# Patient Record
Sex: FEMALE | Race: WHITE | NOT HISPANIC OR LATINO | Employment: FULL TIME | URBAN - METROPOLITAN AREA
[De-identification: names, ages, dates, MRNs, and addresses within clinical notes are randomized per-mention and may not be internally consistent; named-entity substitution may affect disease eponyms.]

---

## 2022-07-26 LAB
HEPATITIS B SURFACE ANTIGEN (EXTERNAL): NONREACTIVE
HEPATITIS C ANTIBODY (EXTERNAL): NONREACTIVE
HIV1+2 AB SERPL QL IA: NONREACTIVE
RUBELLA ANTIBODY IGG (EXTERNAL): NORMAL
TREPONEMA PALLIDUM ANTIBODY (EXTERNAL): NONREACTIVE

## 2022-08-23 ENCOUNTER — HOSPITAL ENCOUNTER (OUTPATIENT)
Facility: AMBULATORY SURGERY CENTER | Age: 27
End: 2022-08-23
Attending: OBSTETRICS & GYNECOLOGY

## 2022-10-21 ENCOUNTER — TRANSCRIBE ORDERS (OUTPATIENT)
Dept: MATERNAL FETAL MEDICINE | Facility: HOSPITAL | Age: 27
End: 2022-10-21

## 2022-10-21 DIAGNOSIS — O26.90 PREGNANCY RELATED CONDITION, ANTEPARTUM: Primary | ICD-10-CM

## 2022-10-26 ENCOUNTER — PRE VISIT (OUTPATIENT)
Dept: MATERNAL FETAL MEDICINE | Facility: HOSPITAL | Age: 27
End: 2022-10-26

## 2022-11-02 ENCOUNTER — OFFICE VISIT (OUTPATIENT)
Dept: MATERNAL FETAL MEDICINE | Facility: HOSPITAL | Age: 27
End: 2022-11-02
Attending: REGISTERED NURSE
Payer: COMMERCIAL

## 2022-11-02 ENCOUNTER — ANCILLARY PROCEDURE (OUTPATIENT)
Dept: ULTRASOUND IMAGING | Facility: HOSPITAL | Age: 27
End: 2022-11-02
Attending: REGISTERED NURSE
Payer: COMMERCIAL

## 2022-11-02 DIAGNOSIS — O44.00 PLACENTA PREVIA ANTEPARTUM: ICD-10-CM

## 2022-11-02 DIAGNOSIS — O35.EXX0 PREGNANCY AFFECTED BY GENITOURINARY ABNORMALITY OF FETUS, SINGLE OR UNSPECIFIED FETUS: Primary | ICD-10-CM

## 2022-11-02 DIAGNOSIS — O26.90 PREGNANCY RELATED CONDITION, ANTEPARTUM: ICD-10-CM

## 2022-11-02 PROCEDURE — 76811 OB US DETAILED SNGL FETUS: CPT | Mod: 26 | Performed by: OBSTETRICS & GYNECOLOGY

## 2022-11-02 PROCEDURE — 76811 OB US DETAILED SNGL FETUS: CPT

## 2022-11-02 PROCEDURE — 99202 OFFICE O/P NEW SF 15 MIN: CPT | Mod: 25 | Performed by: OBSTETRICS & GYNECOLOGY

## 2022-11-02 NOTE — PROGRESS NOTES
"Please see \"Imaging\" tab under \"Chart Review\" for details of today's visit.    Javy Boucher    "

## 2023-01-11 ENCOUNTER — OFFICE VISIT (OUTPATIENT)
Dept: MATERNAL FETAL MEDICINE | Facility: HOSPITAL | Age: 28
End: 2023-01-11
Attending: OBSTETRICS & GYNECOLOGY
Payer: COMMERCIAL

## 2023-01-11 ENCOUNTER — ANCILLARY PROCEDURE (OUTPATIENT)
Dept: ULTRASOUND IMAGING | Facility: HOSPITAL | Age: 28
End: 2023-01-11
Attending: OBSTETRICS & GYNECOLOGY
Payer: COMMERCIAL

## 2023-01-11 DIAGNOSIS — O35.EXX0 PREGNANCY AFFECTED BY GENITOURINARY ABNORMALITY OF FETUS, SINGLE OR UNSPECIFIED FETUS: ICD-10-CM

## 2023-01-11 DIAGNOSIS — O44.00 PLACENTA PREVIA ANTEPARTUM: ICD-10-CM

## 2023-01-11 DIAGNOSIS — O35.EXX0 PREGNANCY AFFECTED BY GENITOURINARY ABNORMALITY OF FETUS, SINGLE OR UNSPECIFIED FETUS: Primary | ICD-10-CM

## 2023-01-11 DIAGNOSIS — O44.43 LOW LYING PLACENTA NOS OR WITHOUT HEMORRHAGE, THIRD TRIMESTER: ICD-10-CM

## 2023-01-11 PROCEDURE — 76816 OB US FOLLOW-UP PER FETUS: CPT | Mod: 26 | Performed by: OBSTETRICS & GYNECOLOGY

## 2023-01-11 PROCEDURE — 99213 OFFICE O/P EST LOW 20 MIN: CPT | Mod: 25 | Performed by: OBSTETRICS & GYNECOLOGY

## 2023-01-11 PROCEDURE — 76816 OB US FOLLOW-UP PER FETUS: CPT

## 2023-01-11 NOTE — PROGRESS NOTES
[facility] Visit    Alan presents to [facility] clinic for ultrasound and recommendations. The following problems were addressed:    Fetal pyelectasis  Low lying placenta    Tests Reviewed: prior ultrasound  Tests Ordered: none  Unique Records reviewed: [facility]    Impression:  1) Garcia intrauterine pregnancy at 32w 2d gestational age.   2) There is persistent, mild bilateral renal pelvis dilatation. Otherwise, none of the anomalies commonly detected by ultrasound were evident in the limited fetal anatomic survey as described above, anatomy limited by gestational age and fetal lie.   3) Growth parameters and estimated fetal weight were consistent with established dates.  4) The amniotic fluid volume appeared normal.  5) Normal fetal activity for gestational age.  6) The placenta is low lying, approximately 1.5 cm from the cervical os.    Plan:  Thank-you for referring your patient to assess fetal growth.     I discussed the findings on today's ultrasound with the patient. A referral to pediatric urology was sent due to persistent renal pelvis dilatation. We reviewed the possible causes for renal pelvis dilatation, and that despite its persistence, it is still most likely to resolve by age 2. No changes to delivery timing or location are recommended due to this finding.    We also discussed the low lying placenta. Given the placenta is > 1 cm from the cervical os, a trial of vaginal delivery is likely reasonable in the absence of vaginal bleeding. I do recommend a follow up ultrasound in your office in 3 weeks to reassess placental location.    Return to primary provider for continued prenatal care.    If you have questions regarding today's evaluation or if we can be of further service, please contact the [facility].    **Fetal anomalies may be present but not detected**    Sofie Bravo MD  Maternal Fetal Medicine    Total Time: 12 minutes spent on the date of the encounter doing chart review, history and  exam, documentation and further activities as noted above.

## 2023-02-07 LAB — GROUP B STREPTOCOCCUS (EXTERNAL): NEGATIVE

## 2023-02-12 ENCOUNTER — HEALTH MAINTENANCE LETTER (OUTPATIENT)
Age: 28
End: 2023-02-12

## 2023-02-21 ENCOUNTER — VIRTUAL VISIT (OUTPATIENT)
Dept: UROLOGY | Facility: CLINIC | Age: 28
End: 2023-02-21
Attending: NURSE PRACTITIONER
Payer: COMMERCIAL

## 2023-02-21 DIAGNOSIS — O35.EXX0 PYELECTASIS OF FETUS ON PRENATAL ULTRASOUND: Primary | ICD-10-CM

## 2023-02-21 PROCEDURE — 99202 OFFICE O/P NEW SF 15 MIN: CPT | Mod: VID | Performed by: NURSE PRACTITIONER

## 2023-02-21 PROCEDURE — G0463 HOSPITAL OUTPT CLINIC VISIT: HCPCS | Mod: PN,GT | Performed by: NURSE PRACTITIONER

## 2023-02-21 NOTE — LETTER
2023      RE: Alan Hayes  [address] [facility] [address]     Dear Colleague,    Thank you for the opportunity to participate in the care of your patient, Alan Hayes, at the [facility] at [facility]. Please see a copy of my visit note below.    Sofie Bravo  [address] [facility] [address]    RE:  Alan Hayes  :  1995  [place] MRN:  [id number]  Date of visit:  2023    Dear Dr. Bravo:    I had the pleasure of seeing your patient, Alan, today through the [facility] in urology consultation for the question of prenatally detected pyelectasis.  Please see below the details of this visit and my impression and plans discussed with the family.        CC:  Prenatal consult    HPI:  Alan Hayes is a 27 year old woman whom I was asked to see in consultation for the above. This is Alan's first pregnancy.  The sex of the fetus is unknown. At the 32 week ultrasound bilateral renal pelvis dilation without dilation of the calyces persisted (UTD A1). So far the pregnancy has been going OK. Alan is planning to deliver at [facility]. Alan's brother has a history of solitary kidney.     PMH:  Reviewed, no significant medical history     PSH:   Reviewed, no surgical history     Meds, allergies, family history, social history reviewed per intake form and confirmed in our EMR.    ROS:  Negative on a 12-point scale. All other pertinent positives mentioned in the HPI.    PE:  Last menstrual period 2022.  There is no height or weight on file to calculate BMI.  General:  Well-appearing woman, in no apparent distress.  HEENT:  Normocephalic, normal facies, moist mucous membranes  Resp:  No audible respirations  Neuromuscular:  Muscles symmetrically bulked/developed  Ext:  Full range of motion    Impression:  Prenatally detected fetal bilateral  pyelectasis (UTD A1), normal bladder.     Plan:    We discussed the likely prenatal causes for this, including prenatal obstructive issues that have already resolved versus those that may need surgical help with resolution in childhood, as well as the possibility of vesicoureteral reflux. We discussed the risks for urinary tract infection, and the pros and cons of starting the baby on daily, low-dose Amoxicillin, dosed at 10 mg/kg/d, which in this case we will likely not do. We also made our plans for follow-up after the baby is born with renal/bladder ultrasound in 2-4 weeks, possible VCUG if indicated after first post  imaging. I addressed all questions and encouraged a phone call from their MFM if there are any future concerns during the pregnancy.    Thank you very much for allowing me the opportunity to participate in this nice family's care with you.    I spent a total of 15 minutes on the date of encounter doing chart review, history, documentation, and further activities as noted above.    Sincerely,    KIYA Foy, CNP  Pediatric Urology  [facility]

## 2023-02-21 NOTE — PATIENT INSTRUCTIONS
[facility]   Department of Pediatric Urology    Dr. Cipriano Correia, Pediatric Urologist  Eric Dempsey, CPNP  KYLER Woo    [facility] [place]  Nurse Coordinator: Cristopher Burch [phone]    [facility]  Nurse Coordinator: [phone]    [facility]  Nurse Coordinator: Cristopher Burch -[phone]      [place]  Nurse Coordinator: GELY Argueta, [phone]      Once your baby is born, please do the following:    -After you have gone home, please call the Nurse Coordinator at your preferred  location and give her your baby s name and date of birth. She will  help coordinate the tests that need to be done about 4 weeks  after birth.    Your baby s test may include:  -Renal Bladder Ultrasound  (all locations)  - Voiding Cystourethrogram (VCUG)  ([facility])  -Mag 3 Lasix Renogram  ([facility])

## 2023-02-21 NOTE — NURSING NOTE
Alan Hayes complains of    Chief Complaint   Patient presents with     RECHECK     Congenital hydronephrosis        Patient would like the video invitation sent by: Text to cell phone: [phone]     Patient is located in [place]? Yes     I have reviewed and updated the patient's medication list, allergies and preferred pharmacy.      Sandie Smith

## 2023-02-21 NOTE — PROGRESS NOTES
Sofie Bravo  [address] [facility] [address]    RE:  Alan Hayes  :  1995  [place] MRN:  [id number]  Date of visit:  2023    Dear Dr. Bravo:    I had the pleasure of seeing your patient, Alan, today through the [facility] in urology consultation for the question of prenatally detected pyelectasis.  Please see below the details of this visit and my impression and plans discussed with the family.        CC:  Prenatal consult    HPI:  Alan Hayes is a 27 year old woman whom I was asked to see in consultation for the above. This is Alan's first pregnancy.  The sex of the fetus is unknown. At the 32 week ultrasound bilateral renal pelvis dilation without dilation of the calyces persisted (UTD A1). So far the pregnancy has been going OK. Alan is planning to deliver at [facility]. Alan's brother has a history of solitary kidney.     PMH:  Reviewed, no significant medical history     PSH:   Reviewed, no surgical history     Meds, allergies, family history, social history reviewed per intake form and confirmed in our EMR.    ROS:  Negative on a 12-point scale. All other pertinent positives mentioned in the HPI.    PE:  Last menstrual period 2022.  There is no height or weight on file to calculate BMI.  General:  Well-appearing woman, in no apparent distress.  HEENT:  Normocephalic, normal facies, moist mucous membranes  Resp:  No audible respirations  Neuromuscular:  Muscles symmetrically bulked/developed  Ext:  Full range of motion    Impression:  Prenatally detected fetal bilateral pyelectasis (UTD A1), normal bladder.     Plan:    We discussed the likely prenatal causes for this, including prenatal obstructive issues that have already resolved versus those that may need surgical help with resolution in childhood, as well as the possibility of vesicoureteral reflux. We discussed the risks for urinary tract infection, and the pros  and cons of starting the baby on daily, low-dose Amoxicillin, dosed at 10 mg/kg/d, which in this case we will likely not do. We also made our plans for follow-up after the baby is born with renal/bladder ultrasound in 2-4 weeks, possible VCUG if indicated after first post  imaging. I addressed all questions and encouraged a phone call from their MFM if there are any future concerns during the pregnancy.    Thank you very much for allowing me the opportunity to participate in this nice family's care with you.    I spent a total of 15 minutes on the date of encounter doing chart review, history, documentation, and further activities as noted above.    Video-Visit Details    Type of service:  Video Visit    Video Start Time (time video started): 08:57    Video End Time (time video stopped): 09:05    Originating Location (pt. Location): Other Car        Distant Location (provider location):  On-site    Mode of Communication:  Video Conference via [employer]      Sincerely,  Eric Dempsey APRN, CNP  Pediatric Urology  [facility]

## 2023-03-02 ENCOUNTER — ANESTHESIA EVENT (OUTPATIENT)
Dept: OBGYN | Facility: CLINIC | Age: 28
End: 2023-03-02
Payer: COMMERCIAL

## 2023-03-02 ENCOUNTER — ANESTHESIA (OUTPATIENT)
Dept: OBGYN | Facility: CLINIC | Age: 28
End: 2023-03-02
Payer: COMMERCIAL

## 2023-03-02 ENCOUNTER — HOSPITAL ENCOUNTER (INPATIENT)
Facility: CLINIC | Age: 28
LOS: 2 days | Discharge: HOME | End: 2023-03-04
Attending: REGISTERED NURSE | Admitting: REGISTERED NURSE
Payer: COMMERCIAL

## 2023-03-02 PROBLEM — Z34.90 TERM PREGNANCY: Status: ACTIVE | Noted: 2023-03-02

## 2023-03-02 LAB
ABO/RH(D): NORMAL
ANTIBODY SCREEN: NEGATIVE
APTT PPP: 26 SECONDS (ref 22–38)
BASOPHILS # BLD AUTO: 0 10E3/UL (ref 0–0.2)
BASOPHILS NFR BLD AUTO: 0 %
EOSINOPHIL # BLD AUTO: 0.1 10E3/UL (ref 0–0.7)
EOSINOPHIL NFR BLD AUTO: 1 %
ERYTHROCYTE [DISTWIDTH] IN BLOOD BY AUTOMATED COUNT: 13.2 % (ref 10–15)
FIBRINOGEN PPP-MCNC: 505 MG/DL (ref 170–490)
HCT VFR BLD AUTO: 40.7 % (ref 35–47)
HGB BLD-MCNC: 14 G/DL (ref 11.7–15.7)
IMM GRANULOCYTES # BLD: 0.1 10E3/UL
IMM GRANULOCYTES NFR BLD: 1 %
INR PPP: 0.95 (ref 0.85–1.15)
LYMPHOCYTES # BLD AUTO: 3.1 10E3/UL (ref 0.8–5.3)
LYMPHOCYTES NFR BLD AUTO: 30 %
MCH RBC QN AUTO: 31.5 PG (ref 26.5–33)
MCHC RBC AUTO-ENTMCNC: 34.4 G/DL (ref 31.5–36.5)
MCV RBC AUTO: 92 FL (ref 78–100)
MONOCYTES # BLD AUTO: 1 10E3/UL (ref 0–1.3)
MONOCYTES NFR BLD AUTO: 9 %
NEUTROPHILS # BLD AUTO: 6.1 10E3/UL (ref 1.6–8.3)
NEUTROPHILS NFR BLD AUTO: 59 %
NRBC # BLD AUTO: 0 10E3/UL
NRBC BLD AUTO-RTO: 0 /100
PLATELET # BLD AUTO: 233 10E3/UL (ref 150–450)
RBC # BLD AUTO: 4.45 10E6/UL (ref 3.8–5.2)
RUPTURE OF FETAL MEMBRANES BY ROM PLUS: POSITIVE
SPECIMEN EXPIRATION DATE: NORMAL
T PALLIDUM AB SER QL: NONREACTIVE
WBC # BLD AUTO: 10.4 10E3/UL (ref 4–11)

## 2023-03-02 PROCEDURE — 85730 THROMBOPLASTIN TIME PARTIAL: CPT | Performed by: REGISTERED NURSE

## 2023-03-02 PROCEDURE — 250N000011 HC RX IP 250 OP 636: Performed by: REGISTERED NURSE

## 2023-03-02 PROCEDURE — 86780 TREPONEMA PALLIDUM: CPT | Performed by: REGISTERED NURSE

## 2023-03-02 PROCEDURE — 86850 RBC ANTIBODY SCREEN: CPT | Performed by: REGISTERED NURSE

## 2023-03-02 PROCEDURE — 85025 COMPLETE CBC W/AUTO DIFF WBC: CPT | Performed by: REGISTERED NURSE

## 2023-03-02 PROCEDURE — 00HU33Z INSERTION OF INFUSION DEVICE INTO SPINAL CANAL, PERCUTANEOUS APPROACH: ICD-10-PCS | Performed by: ANESTHESIOLOGY

## 2023-03-02 PROCEDURE — 370N000003 HC ANESTHESIA WARD SERVICE

## 2023-03-02 PROCEDURE — 250N000011 HC RX IP 250 OP 636: Performed by: ANESTHESIOLOGY

## 2023-03-02 PROCEDURE — 85610 PROTHROMBIN TIME: CPT | Performed by: REGISTERED NURSE

## 2023-03-02 PROCEDURE — 120N000001 HC R&B MED SURG/OB

## 2023-03-02 PROCEDURE — 250N000009 HC RX 250: Performed by: ANESTHESIOLOGY

## 2023-03-02 PROCEDURE — 84112 EVAL AMNIOTIC FLUID PROTEIN: CPT | Performed by: REGISTERED NURSE

## 2023-03-02 PROCEDURE — 250N000009 HC RX 250: Performed by: REGISTERED NURSE

## 2023-03-02 PROCEDURE — 36415 COLL VENOUS BLD VENIPUNCTURE: CPT | Performed by: REGISTERED NURSE

## 2023-03-02 PROCEDURE — 250N000013 HC RX MED GY IP 250 OP 250 PS 637: Performed by: REGISTERED NURSE

## 2023-03-02 PROCEDURE — 258N000003 HC RX IP 258 OP 636: Performed by: REGISTERED NURSE

## 2023-03-02 PROCEDURE — 3E0R3BZ INTRODUCTION OF ANESTHETIC AGENT INTO SPINAL CANAL, PERCUTANEOUS APPROACH: ICD-10-PCS | Performed by: ANESTHESIOLOGY

## 2023-03-02 PROCEDURE — 85384 FIBRINOGEN ACTIVITY: CPT | Performed by: REGISTERED NURSE

## 2023-03-02 RX ORDER — NALBUPHINE HYDROCHLORIDE 10 MG/ML
2.5-5 INJECTION, SOLUTION INTRAMUSCULAR; INTRAVENOUS; SUBCUTANEOUS EVERY 6 HOURS PRN
Status: DISCONTINUED | OUTPATIENT
Start: 2023-03-02 | End: 2023-03-04 | Stop reason: HOSPADM

## 2023-03-02 RX ORDER — METHYLERGONOVINE MALEATE 0.2 MG/ML
200 INJECTION INTRAVENOUS
Status: DISCONTINUED | OUTPATIENT
Start: 2023-03-02 | End: 2023-03-03 | Stop reason: HOSPADM

## 2023-03-02 RX ORDER — BUPIVACAINE HYDROCHLORIDE 2.5 MG/ML
INJECTION, SOLUTION EPIDURAL; INFILTRATION; INTRACAUDAL
Status: COMPLETED | OUTPATIENT
Start: 2023-03-02 | End: 2023-03-02

## 2023-03-02 RX ORDER — CITRIC ACID/SODIUM CITRATE 334-500MG
30 SOLUTION, ORAL ORAL
Status: DISCONTINUED | OUTPATIENT
Start: 2023-03-02 | End: 2023-03-03 | Stop reason: HOSPADM

## 2023-03-02 RX ORDER — CALCIUM CARBONATE 500 MG/1
1000 TABLET, CHEWABLE ORAL 3 TIMES DAILY PRN
Status: DISCONTINUED | OUTPATIENT
Start: 2023-03-02 | End: 2023-03-04 | Stop reason: HOSPADM

## 2023-03-02 RX ORDER — LIDOCAINE 40 MG/G
CREAM TOPICAL
Status: DISCONTINUED | OUTPATIENT
Start: 2023-03-02 | End: 2023-03-02 | Stop reason: HOSPADM

## 2023-03-02 RX ORDER — LIDOCAINE 40 MG/G
CREAM TOPICAL
Status: DISCONTINUED | OUTPATIENT
Start: 2023-03-02 | End: 2023-03-03 | Stop reason: HOSPADM

## 2023-03-02 RX ORDER — MISOPROSTOL 200 UG/1
400 TABLET ORAL
Status: DISCONTINUED | OUTPATIENT
Start: 2023-03-02 | End: 2023-03-03 | Stop reason: HOSPADM

## 2023-03-02 RX ORDER — PROCHLORPERAZINE 25 MG
25 SUPPOSITORY, RECTAL RECTAL EVERY 12 HOURS PRN
Status: DISCONTINUED | OUTPATIENT
Start: 2023-03-02 | End: 2023-03-03 | Stop reason: HOSPADM

## 2023-03-02 RX ORDER — FENTANYL/ROPIVACAINE/NS/PF 2MCG/ML-.1
PLASTIC BAG, INJECTION (ML) EPIDURAL
Status: DISCONTINUED | OUTPATIENT
Start: 2023-03-02 | End: 2023-03-03 | Stop reason: HOSPADM

## 2023-03-02 RX ORDER — LIDOCAINE HYDROCHLORIDE AND EPINEPHRINE 15; 5 MG/ML; UG/ML
INJECTION, SOLUTION EPIDURAL PRN
Status: DISCONTINUED | OUTPATIENT
Start: 2023-03-02 | End: 2023-03-03

## 2023-03-02 RX ORDER — OXYTOCIN/0.9 % SODIUM CHLORIDE 30/500 ML
340 PLASTIC BAG, INJECTION (ML) INTRAVENOUS CONTINUOUS PRN
Status: DISCONTINUED | OUTPATIENT
Start: 2023-03-02 | End: 2023-03-03 | Stop reason: HOSPADM

## 2023-03-02 RX ORDER — FENTANYL CITRATE-0.9 % NACL/PF 10 MCG/ML
100 PLASTIC BAG, INJECTION (ML) INTRAVENOUS EVERY 5 MIN PRN
Status: DISCONTINUED | OUTPATIENT
Start: 2023-03-02 | End: 2023-03-03 | Stop reason: HOSPADM

## 2023-03-02 RX ORDER — IBUPROFEN 800 MG/1
800 TABLET, FILM COATED ORAL
Status: DISCONTINUED | OUTPATIENT
Start: 2023-03-02 | End: 2023-03-04 | Stop reason: HOSPADM

## 2023-03-02 RX ORDER — KETOROLAC TROMETHAMINE 30 MG/ML
30 INJECTION, SOLUTION INTRAMUSCULAR; INTRAVENOUS
Status: DISCONTINUED | OUTPATIENT
Start: 2023-03-02 | End: 2023-03-04 | Stop reason: HOSPADM

## 2023-03-02 RX ORDER — MISOPROSTOL 200 UG/1
800 TABLET ORAL
Status: DISCONTINUED | OUTPATIENT
Start: 2023-03-02 | End: 2023-03-03 | Stop reason: HOSPADM

## 2023-03-02 RX ORDER — OXYTOCIN/0.9 % SODIUM CHLORIDE 30/500 ML
100-340 PLASTIC BAG, INJECTION (ML) INTRAVENOUS CONTINUOUS PRN
Status: DISCONTINUED | OUTPATIENT
Start: 2023-03-02 | End: 2023-03-04 | Stop reason: HOSPADM

## 2023-03-02 RX ORDER — NALOXONE HYDROCHLORIDE 0.4 MG/ML
0.4 INJECTION, SOLUTION INTRAMUSCULAR; INTRAVENOUS; SUBCUTANEOUS
Status: DISCONTINUED | OUTPATIENT
Start: 2023-03-02 | End: 2023-03-03 | Stop reason: HOSPADM

## 2023-03-02 RX ORDER — METOCLOPRAMIDE 10 MG/1
10 TABLET ORAL EVERY 6 HOURS PRN
Status: DISCONTINUED | OUTPATIENT
Start: 2023-03-02 | End: 2023-03-03 | Stop reason: HOSPADM

## 2023-03-02 RX ORDER — ONDANSETRON 2 MG/ML
4 INJECTION INTRAMUSCULAR; INTRAVENOUS EVERY 6 HOURS PRN
Status: DISCONTINUED | OUTPATIENT
Start: 2023-03-02 | End: 2023-03-03 | Stop reason: HOSPADM

## 2023-03-02 RX ORDER — CARBOPROST TROMETHAMINE 250 UG/ML
250 INJECTION, SOLUTION INTRAMUSCULAR
Status: DISCONTINUED | OUTPATIENT
Start: 2023-03-02 | End: 2023-03-03 | Stop reason: HOSPADM

## 2023-03-02 RX ORDER — ONDANSETRON 4 MG/1
4 TABLET, ORALLY DISINTEGRATING ORAL EVERY 6 HOURS PRN
Status: DISCONTINUED | OUTPATIENT
Start: 2023-03-02 | End: 2023-03-03 | Stop reason: HOSPADM

## 2023-03-02 RX ORDER — FENTANYL CITRATE 50 UG/ML
100 INJECTION, SOLUTION INTRAMUSCULAR; INTRAVENOUS
Status: DISCONTINUED | OUTPATIENT
Start: 2023-03-02 | End: 2023-03-03 | Stop reason: HOSPADM

## 2023-03-02 RX ORDER — SODIUM CHLORIDE, SODIUM LACTATE, POTASSIUM CHLORIDE, CALCIUM CHLORIDE 600; 310; 30; 20 MG/100ML; MG/100ML; MG/100ML; MG/100ML
INJECTION, SOLUTION INTRAVENOUS CONTINUOUS PRN
Status: DISCONTINUED | OUTPATIENT
Start: 2023-03-02 | End: 2023-03-03 | Stop reason: HOSPADM

## 2023-03-02 RX ORDER — NALOXONE HYDROCHLORIDE 0.4 MG/ML
0.2 INJECTION, SOLUTION INTRAMUSCULAR; INTRAVENOUS; SUBCUTANEOUS
Status: DISCONTINUED | OUTPATIENT
Start: 2023-03-02 | End: 2023-03-03 | Stop reason: HOSPADM

## 2023-03-02 RX ORDER — OXYTOCIN 10 [USP'U]/ML
10 INJECTION, SOLUTION INTRAMUSCULAR; INTRAVENOUS
Status: DISCONTINUED | OUTPATIENT
Start: 2023-03-02 | End: 2023-03-04 | Stop reason: HOSPADM

## 2023-03-02 RX ORDER — OXYTOCIN 10 [USP'U]/ML
10 INJECTION, SOLUTION INTRAMUSCULAR; INTRAVENOUS
Status: DISCONTINUED | OUTPATIENT
Start: 2023-03-02 | End: 2023-03-03 | Stop reason: HOSPADM

## 2023-03-02 RX ORDER — OXYTOCIN/0.9 % SODIUM CHLORIDE 30/500 ML
1-24 PLASTIC BAG, INJECTION (ML) INTRAVENOUS CONTINUOUS
Status: DISCONTINUED | OUTPATIENT
Start: 2023-03-02 | End: 2023-03-03 | Stop reason: HOSPADM

## 2023-03-02 RX ORDER — METOCLOPRAMIDE HYDROCHLORIDE 5 MG/ML
10 INJECTION INTRAMUSCULAR; INTRAVENOUS EVERY 6 HOURS PRN
Status: DISCONTINUED | OUTPATIENT
Start: 2023-03-02 | End: 2023-03-03 | Stop reason: HOSPADM

## 2023-03-02 RX ORDER — TERBUTALINE SULFATE 1 MG/ML
0.25 INJECTION, SOLUTION SUBCUTANEOUS
Status: DISCONTINUED | OUTPATIENT
Start: 2023-03-02 | End: 2023-03-03 | Stop reason: HOSPADM

## 2023-03-02 RX ORDER — PROCHLORPERAZINE MALEATE 10 MG
10 TABLET ORAL EVERY 6 HOURS PRN
Status: DISCONTINUED | OUTPATIENT
Start: 2023-03-02 | End: 2023-03-03 | Stop reason: HOSPADM

## 2023-03-02 RX ADMIN — LIDOCAINE HYDROCHLORIDE,EPINEPHRINE BITARTRATE 3 ML: 15; .005 INJECTION, SOLUTION EPIDURAL; INFILTRATION; INTRACAUDAL; PERINEURAL at 20:58

## 2023-03-02 RX ADMIN — LIDOCAINE HYDROCHLORIDE,EPINEPHRINE BITARTRATE 2 ML: 15; .005 INJECTION, SOLUTION EPIDURAL; INFILTRATION; INTRACAUDAL; PERINEURAL at 21:01

## 2023-03-02 RX ADMIN — SODIUM CHLORIDE, POTASSIUM CHLORIDE, SODIUM LACTATE AND CALCIUM CHLORIDE: 600; 310; 30; 20 INJECTION, SOLUTION INTRAVENOUS at 14:26

## 2023-03-02 RX ADMIN — Medication: at 21:05

## 2023-03-02 RX ADMIN — Medication 2 MILLI-UNITS/MIN: at 14:27

## 2023-03-02 RX ADMIN — CALCIUM CARBONATE (ANTACID) CHEW TAB 500 MG 1000 MG: 500 CHEW TAB at 11:43

## 2023-03-02 RX ADMIN — BUPIVACAINE HYDROCHLORIDE 5 ML: 2.5 INJECTION, SOLUTION EPIDURAL; INFILTRATION; INTRACAUDAL at 21:04

## 2023-03-02 RX ADMIN — ONDANSETRON 4 MG: 2 INJECTION INTRAMUSCULAR; INTRAVENOUS at 18:32

## 2023-03-02 RX ADMIN — SODIUM CHLORIDE, POTASSIUM CHLORIDE, SODIUM LACTATE AND CALCIUM CHLORIDE: 600; 310; 30; 20 INJECTION, SOLUTION INTRAVENOUS at 21:04

## 2023-03-02 RX ADMIN — CALCIUM CARBONATE (ANTACID) CHEW TAB 500 MG 1000 MG: 500 CHEW TAB at 05:28

## 2023-03-02 ASSESSMENT — ACTIVITIES OF DAILY LIVING (ADL)
ADLS_ACUITY_SCORE: 18
TOILETING_ISSUES: NO
ADLS_ACUITY_SCORE: 18
NUMBER_OF_TIMES_PATIENT_HAS_FALLEN_WITHIN_LAST_SIX_MONTHS: 1
ADLS_ACUITY_SCORE: 18
ADLS_ACUITY_SCORE: 18
CHANGE_IN_FUNCTIONAL_STATUS_SINCE_ONSET_OF_CURRENT_ILLNESS/INJURY: NO
ADLS_ACUITY_SCORE: 18
ADLS_ACUITY_SCORE: 18
ADLS_ACUITY_SCORE: 31
ADLS_ACUITY_SCORE: 18
WALKING_OR_CLIMBING_STAIRS_DIFFICULTY: NO
ADLS_ACUITY_SCORE: 18
ADLS_ACUITY_SCORE: 18
WEAR_GLASSES_OR_BLIND: NO
DRESSING/BATHING_DIFFICULTY: NO
DIFFICULTY_EATING/SWALLOWING: NO
CONCENTRATING,_REMEMBERING_OR_MAKING_DECISIONS_DIFFICULTY: NO
ADLS_ACUITY_SCORE: 18
FALL_HISTORY_WITHIN_LAST_SIX_MONTHS: YES
ADLS_ACUITY_SCORE: 18
DOING_ERRANDS_INDEPENDENTLY_DIFFICULTY: NO

## 2023-03-02 NOTE — PROVIDER NOTIFICATION
THEODORE Cortez called and notified pt SVE 3-4/70/-3. Pt had positive pooling upon speculum exam. ROM+ sent per CNM. CNM recommends pitocin to help augment labor. Pt educated on pitocin and currently wants to wait on augmentation. Pt is currently walking in the frank way. Will continue to monitor

## 2023-03-02 NOTE — PROVIDER NOTIFICATION
THEODORE Cortez called and requesting ROM+ to be repeated with a speculum and cervical exam around 6am. Pt is curently resting and no large amount of bleeding at this time. CAT1 tracing

## 2023-03-02 NOTE — PROGRESS NOTES
"Patient Name:  Alan Hayes  :      1995  MRN:      [id number]    Assessment:     at 39w3d  Early labor  Category 1 FHTs  SROM 3/2/23 2000      Plan:   -Bleeding has now greatly improved to light with mostly brown spotting. RN now able to verify pooling and resent ROM+. In the now confirmed setting of ROM I would recommend pitocin titration as cervix has remained unchanged and Alan remains comfortable without regular contractions.   -Routine support & management. Encourage position changes, ambulation as appropriate, rest as desired.  -Anticipate progress and NSVB.   -Reevaluate progress in 6 hours or sooner with a change in status.     Subjective:  Alan Hayes is coping well with contractions. Using non pharmacologic  for pain relief. Good PO fluid intake.   Voiding without issue. , Kaushal, supportive at bedside.       Objective:  /71   Temp 98.1  F (36.7  C) (Oral)   Resp 19   Ht 1.575 m (5' 2\")   Wt 66.7 kg (147 lb)   LMP 2022   Breastfeeding No   BMI 26.89 kg/m      FHR:Baseline: 115 bpm, Variability: Moderate (6 - 25 bpm), Accelerations: present and Decelerations: Absent    Uterine contractions:TocoFrequency: Every 5-6 minutes, Duration: 40-60 seconds and Intensity: moderate    SVE:3-4/50/-3 unchanged from arrival    Provider: Gisell Garcia CNM      Date:  3/2/2023  Time:  6:23 AM    "

## 2023-03-02 NOTE — H&P
HISTORY AND PHYSICAL UPDATE ADMISSION EXAM    Name: Alan Hayes  YOB: 1995  Medical Record Number: [id number]    History of Present Illness: Alan Hayes is a 27 year old female who is 39w3d pregnant and being admitted for labor management. She presented to [facility] with complaints of worsening bleeding throughout the evening. She has soaked multiple pads with bright red bleeding. Pregnancy was complicated by persistent low lying placenta resolved to 2.4cm at 34w and history of LEEP. 35w growth US 67%. She is supported by her partner, Kaushal. She is jaqueline regularly and can feel them but denies outright pain.     Estimated Date of Delivery: Mar 6, 2023    EGA 39w3d    OB History    Para Term  AB Living   1 0 0 0 0 0   SAB IAB Ectopic Multiple Live Births   0 0 0 0 0      # Outcome Date GA Lbr Uriel/2nd Weight Sex Delivery Anes PTL Lv   1 Current                 Lab Results   Component Value Date    AS Negative 2023    HGB 14.0 2023       Prenatal Complications:     1) Varicella non-immune  2) Previa--> resolved 2.4cm from os at 34w  3) History of LEEP  4) Bilateral fetal pyelectasis-saw peds urology (UTD A1)    Exam:      LMP 2022   Breastfeeding No     Fetal heart Rate Category 1  Contractions 1-3 minutes    HEENT grossly normal  Neck: no lymphadenopathy or thryoidomegaly  Lungs CTAB  Heart RRR  ABD gravid, non-tender  EXT:  No edema, moves freely  Vaginal exam 1/50/-3 in clinic today--> 3-4/50/-3, large amount of bloody show  Membranes: ROM+ with possible false positive given large amount of bleeding; unable to determine pooling or ferning. Palpable bag present, possible high leak.   Cephalic by BSUS    Assessment: labor management  GBS negative  Vaginal bleeding    Plan: Admit - see IP orders  Pain medication as desired, she is undecided. Will take things as it goes.   Start pad weights. Clotting panel pending. Continuous fetal monitoring. Cervix feels soft with  ridge of scar tissue (history of LEEP), bleeding likely cervical. Will continue to watch closely for signs of abruption of change in fetal status. She was seen today in clinic for DFM, NST was reactive at that time.   Anticipate   Active management of third stage    Prenatal record reviewed.    THEODORE Chauhan Dr. aware of patient status and remains available for consultation and collaboration as needed.  3/2/2023   1:51 AM

## 2023-03-02 NOTE — PROGRESS NOTES
Pt got up to the bathroom. Pt states there is only a spot of old blood. There was no red bleeding. New pad applied will continue to monitor

## 2023-03-02 NOTE — PROGRESS NOTES
"Patient Name:  Alan Hayes  :      1995  MRN:      [id number]    Assessment:     at 39w3d  Early labor  Category 1 FHTs  SROM 11 hours  Vaginal bleeding    Plan:   -Discussed RBA of SVE , pitocin augmentation or expectant management , she declines SVE and pitocin at this time, prefers expectant management and reassess in an hour  -She is requesting breast pumping for oxytocin release  -Routine support & management. Encourage position changes, ambulation as appropriate, rest as desired.  -Anticipate progress and NSVB.   -Reevaluate progress in 1 hour or sooner with a change in status.     Subjective:  Alan Hayes is coping well with contractions. Using non pharmacologic  for pain relief. She is still feeling fluid leaking and had an addditional clot. She is walking in the room and bouncing on the ball.  Good PO fluid intake. Voiding without issue. Family supportive at bedside.       Objective:  /84   Pulse 102   Temp 98.4  F (36.9  C) (Oral)   Resp 16   Ht 1.575 m (5' 2\")   Wt 66.7 kg (147 lb)   LMP 2022   Breastfeeding No   BMI 26.89 kg/m      FHR- IA with no decreases per RN  Uterine contractions:Palpating mild/mod every 5-6 minutes  SVE:declined at this time    Provider: KIYA Concepcion CNM      Date:  3/2/2023  Time:  2:01 PM    "

## 2023-03-02 NOTE — PLAN OF CARE
Problem: Labor  Goal: Hemostasis  Outcome: Progressing  Goal: Stable Fetal Wellbeing  Outcome: Progressing  Goal: Absence of Infection Signs and Symptoms  Outcome: Progressing           Pt is walking around in room. Pt denies pain at this time. Pt wants to have a vaginal delivery. VSS. Pt is currently thinking about possibly doing pit per CNM, but pt declines at this time. Will continue to monitor

## 2023-03-02 NOTE — PROGRESS NOTES
"Patient Name:  Alan Hayes  :      1995  MRN:      [id number]    Assessment:     at 39w3d  Early labor  Category 1 FHTs  SROM/AROM of forebag clear fluid      Plan:   -Discussed R/B/A of pitocin vs amniotomy vs expectant management. At this time she would like to proceed with AROM of forebag and low dose pitocin  -Routine support & management. Encourage position changes, ambulation as appropriate, rest as desired.  -Anticipate progress and NSVB.   -Pain medication as requested  - support if desires  -Reevaluate progress in 6 hours or sooner with a change in status.     Subjective:  Alan Hayes is coping well with contractions, still able to talk ad walk during. Using non pharmacologic  for pain relief. Good PO fluid intake. Voiding without issue. Family supportive at bedside. (Checked in with Alan around 11am and felt contractions were getting more intense)      Objective:  /84   Pulse 102   Temp 98.4  F (36.9  C) (Oral)   Resp 16   Ht 1.575 m (5' 2\")   Wt 66.7 kg (147 lb)   LMP 2022   Breastfeeding No   BMI 26.89 kg/m      FHR:Baseline: 120 bpm, Variability: Moderate (6 - 25 bpm), Accelerations: present and Decelerations: Absent    Uterine contractions:TocoFrequency: Every 5-6 minutes, Duration: 60-80 seconds and Intensity: moderate    SVE:3-4/70/-2-3       Provider: KIYA Concepcion CNM, Dr. remains available for consultation and collaboration as needed.      Date:  3/2/2023  Time:  2:25 PM    "

## 2023-03-03 LAB — HGB BLD-MCNC: 11.8 G/DL (ref 11.7–15.7)

## 2023-03-03 PROCEDURE — 36415 COLL VENOUS BLD VENIPUNCTURE: CPT | Performed by: ADVANCED PRACTICE MIDWIFE

## 2023-03-03 PROCEDURE — 120N000001 HC R&B MED SURG/OB

## 2023-03-03 PROCEDURE — 250N000011 HC RX IP 250 OP 636: Performed by: REGISTERED NURSE

## 2023-03-03 PROCEDURE — 999N000016 HC STATISTIC ATTENDANCE AT DELIVERY

## 2023-03-03 PROCEDURE — 250N000009 HC RX 250: Performed by: REGISTERED NURSE

## 2023-03-03 PROCEDURE — 0DQR0ZZ REPAIR ANAL SPHINCTER, OPEN APPROACH: ICD-10-PCS | Performed by: OBSTETRICS & GYNECOLOGY

## 2023-03-03 PROCEDURE — 250N000013 HC RX MED GY IP 250 OP 250 PS 637: Performed by: REGISTERED NURSE

## 2023-03-03 PROCEDURE — 0UQGXZZ REPAIR VAGINA, EXTERNAL APPROACH: ICD-10-PCS | Performed by: OBSTETRICS & GYNECOLOGY

## 2023-03-03 PROCEDURE — 250N000013 HC RX MED GY IP 250 OP 250 PS 637: Performed by: ADVANCED PRACTICE MIDWIFE

## 2023-03-03 PROCEDURE — 85018 HEMOGLOBIN: CPT | Performed by: ADVANCED PRACTICE MIDWIFE

## 2023-03-03 PROCEDURE — 10907ZC DRAINAGE OF AMNIOTIC FLUID, THERAPEUTIC FROM PRODUCTS OF CONCEPTION, VIA NATURAL OR ARTIFICIAL OPENING: ICD-10-PCS | Performed by: ADVANCED PRACTICE MIDWIFE

## 2023-03-03 PROCEDURE — 0KQM0ZZ REPAIR PERINEUM MUSCLE, OPEN APPROACH: ICD-10-PCS | Performed by: OBSTETRICS & GYNECOLOGY

## 2023-03-03 PROCEDURE — 722N000001 HC LABOR CARE VAGINAL DELIVERY SINGLE

## 2023-03-03 PROCEDURE — 250N000009 HC RX 250: Performed by: ADVANCED PRACTICE MIDWIFE

## 2023-03-03 RX ORDER — CARBOPROST TROMETHAMINE 250 UG/ML
250 INJECTION, SOLUTION INTRAMUSCULAR
Status: DISCONTINUED | OUTPATIENT
Start: 2023-03-03 | End: 2023-03-04 | Stop reason: HOSPADM

## 2023-03-03 RX ORDER — MAGNESIUM CARB/ALUMINUM HYDROX 105-160MG
30 TABLET,CHEWABLE ORAL DAILY PRN
Status: DISCONTINUED | OUTPATIENT
Start: 2023-03-03 | End: 2023-03-03

## 2023-03-03 RX ORDER — METHYLERGONOVINE MALEATE 0.2 MG/ML
200 INJECTION INTRAVENOUS
Status: DISCONTINUED | OUTPATIENT
Start: 2023-03-03 | End: 2023-03-04 | Stop reason: HOSPADM

## 2023-03-03 RX ORDER — OXYCODONE HYDROCHLORIDE 5 MG/1
5 TABLET ORAL EVERY 4 HOURS PRN
Status: DISCONTINUED | OUTPATIENT
Start: 2023-03-03 | End: 2023-03-04 | Stop reason: HOSPADM

## 2023-03-03 RX ORDER — NALOXONE HYDROCHLORIDE 0.4 MG/ML
0.4 INJECTION, SOLUTION INTRAMUSCULAR; INTRAVENOUS; SUBCUTANEOUS
Status: DISCONTINUED | OUTPATIENT
Start: 2023-03-03 | End: 2023-03-04 | Stop reason: HOSPADM

## 2023-03-03 RX ORDER — HYDROCORTISONE 25 MG/G
CREAM TOPICAL 3 TIMES DAILY PRN
Status: DISCONTINUED | OUTPATIENT
Start: 2023-03-03 | End: 2023-03-04 | Stop reason: HOSPADM

## 2023-03-03 RX ORDER — IBUPROFEN 800 MG/1
800 TABLET, FILM COATED ORAL EVERY 6 HOURS PRN
Status: DISCONTINUED | OUTPATIENT
Start: 2023-03-03 | End: 2023-03-04 | Stop reason: HOSPADM

## 2023-03-03 RX ORDER — MODIFIED LANOLIN
OINTMENT (GRAM) TOPICAL
Status: DISCONTINUED | OUTPATIENT
Start: 2023-03-03 | End: 2023-03-04 | Stop reason: HOSPADM

## 2023-03-03 RX ORDER — NALOXONE HYDROCHLORIDE 0.4 MG/ML
0.2 INJECTION, SOLUTION INTRAMUSCULAR; INTRAVENOUS; SUBCUTANEOUS
Status: DISCONTINUED | OUTPATIENT
Start: 2023-03-03 | End: 2023-03-04 | Stop reason: HOSPADM

## 2023-03-03 RX ORDER — ACETAMINOPHEN 325 MG/1
650 TABLET ORAL EVERY 4 HOURS PRN
Status: DISCONTINUED | OUTPATIENT
Start: 2023-03-03 | End: 2023-03-04 | Stop reason: HOSPADM

## 2023-03-03 RX ORDER — MINERAL OIL
OIL (ML) MISCELLANEOUS ONCE
Status: COMPLETED | OUTPATIENT
Start: 2023-03-03 | End: 2023-03-03

## 2023-03-03 RX ORDER — DOCUSATE SODIUM 100 MG/1
100 CAPSULE, LIQUID FILLED ORAL 2 TIMES DAILY
Status: DISCONTINUED | OUTPATIENT
Start: 2023-03-03 | End: 2023-03-04 | Stop reason: HOSPADM

## 2023-03-03 RX ORDER — MISOPROSTOL 200 UG/1
800 TABLET ORAL
Status: DISCONTINUED | OUTPATIENT
Start: 2023-03-03 | End: 2023-03-04 | Stop reason: HOSPADM

## 2023-03-03 RX ORDER — MISOPROSTOL 200 UG/1
400 TABLET ORAL
Status: DISCONTINUED | OUTPATIENT
Start: 2023-03-03 | End: 2023-03-04 | Stop reason: HOSPADM

## 2023-03-03 RX ORDER — OXYTOCIN 10 [USP'U]/ML
10 INJECTION, SOLUTION INTRAMUSCULAR; INTRAVENOUS
Status: DISCONTINUED | OUTPATIENT
Start: 2023-03-03 | End: 2023-03-04 | Stop reason: HOSPADM

## 2023-03-03 RX ORDER — OXYTOCIN/0.9 % SODIUM CHLORIDE 30/500 ML
340 PLASTIC BAG, INJECTION (ML) INTRAVENOUS CONTINUOUS PRN
Status: DISCONTINUED | OUTPATIENT
Start: 2023-03-03 | End: 2023-03-04 | Stop reason: HOSPADM

## 2023-03-03 RX ADMIN — ONDANSETRON 4 MG: 2 INJECTION INTRAMUSCULAR; INTRAVENOUS at 03:09

## 2023-03-03 RX ADMIN — Medication: at 02:45

## 2023-03-03 RX ADMIN — DOCUSATE SODIUM 100 MG: 100 CAPSULE, LIQUID FILLED ORAL at 07:32

## 2023-03-03 RX ADMIN — LIDOCAINE HYDROCHLORIDE 20 ML: 10 INJECTION, SOLUTION INFILTRATION; PERINEURAL at 04:00

## 2023-03-03 RX ADMIN — ACETAMINOPHEN 650 MG: 325 TABLET ORAL at 19:40

## 2023-03-03 RX ADMIN — IBUPROFEN 800 MG: 800 TABLET ORAL at 21:57

## 2023-03-03 RX ADMIN — ACETAMINOPHEN 650 MG: 325 TABLET ORAL at 07:31

## 2023-03-03 RX ADMIN — BENZOCAINE AND LEVOMENTHOL: 200; 5 SPRAY TOPICAL at 08:41

## 2023-03-03 RX ADMIN — WITCH HAZEL: 500 SOLUTION RECTAL; TOPICAL at 08:41

## 2023-03-03 RX ADMIN — CALCIUM CARBONATE (ANTACID) CHEW TAB 500 MG 1000 MG: 500 CHEW TAB at 01:19

## 2023-03-03 RX ADMIN — KETOROLAC TROMETHAMINE 30 MG: 30 INJECTION, SOLUTION INTRAMUSCULAR; INTRAVENOUS at 07:31

## 2023-03-03 RX ADMIN — IBUPROFEN 800 MG: 800 TABLET ORAL at 15:40

## 2023-03-03 RX ADMIN — DOCUSATE SODIUM 100 MG: 100 CAPSULE, LIQUID FILLED ORAL at 19:52

## 2023-03-03 ASSESSMENT — ACTIVITIES OF DAILY LIVING (ADL)
ADLS_ACUITY_SCORE: 18
ADLS_ACUITY_SCORE: 22
ADLS_ACUITY_SCORE: 18
ADLS_ACUITY_SCORE: 22
ADLS_ACUITY_SCORE: 18
ADLS_ACUITY_SCORE: 22
ADLS_ACUITY_SCORE: 18
ADLS_ACUITY_SCORE: 22
ADLS_ACUITY_SCORE: 18
ADLS_ACUITY_SCORE: 18

## 2023-03-03 NOTE — ANESTHESIA POSTPROCEDURE EVALUATION
Patient: Alan Hayes    Procedure: * No procedures listed *       Anesthesia Type:  Epidural    Note:  Disposition: Inpatient   Postop Pain Control: Uneventful            Sign Out: Well controlled pain   PONV: No   Neuro/Psych: Uneventful            Sign Out: Acceptable/Baseline neuro status   Airway/Respiratory: Uneventful            Sign Out: Acceptable/Baseline resp. status   CV/Hemodynamics: Uneventful            Sign Out: Acceptable CV status; No obvious hypovolemia; No obvious fluid overload   Other NRE: NONE   DID A NON-ROUTINE EVENT OCCUR? No           Last vitals:  Vitals:    03/03/23 0527 03/03/23 0532 03/03/23 0537   BP:   122/67   Pulse:      Resp:   18   Temp:   36.8  C (98.3  F)   SpO2: 100% 100% 100%       Electronically Signed By: Carlos Potter MD  March 3, 2023  5:47 AM

## 2023-03-03 NOTE — CONSULTS
"ACUPUNCTURIST TREATMENT NOTE    Name: Alan Hayes  :  1995  MRN:  [id number]    Acupuncture Treatment  Patient Type: Maternity  Intervention Reason: Pain, Pain 2  Pain Location: low back and bilateral hips  Pre-session Pain Ratin  Post-session Pain Ratin  Pain 2 Location: uterine cramping  Pre-session Pain 2 Ratin  Post-session Pain 2 Ratin  Patient complaint:: pain at low back and bilateral hips, uterine cramping, insufficient lactation  Initial insertions: Du 20, Yin Dupont, LI 4, Sp 8, Sp 6, GB 34, Zenia 3, GB 41         \"Risks and benefits of acupuncture were discussed with patient. Consent for treatment was given. We thank you for the referral.\"     Shelly Moore L.Ac.     Date:  3/3/2023  Time:  1:05 PM    "

## 2023-03-03 NOTE — L&D DELIVERY NOTE
REPAIR OF 3RD DEGREE AND BILATERAL VAGINAL LACERATION.    ANESTHESIA: 1%LIDOCAINE AND EPIDURAL    PROCEDURE: With my arrival to the room the bed was broken down in anticipation for repair.  Patient had some sensation therefore 1% lidocaine plain was injected in the perineal body and vagina.  Inspection the vagina revealed bilateral vaginal sulcus tears.  Right greater than left.  I started the repair at the right side with 3-0 Vicryl which was brought down in a running locking suture to the introitus.  Similarly on the left side this was brought down to the introitus.  At the introital area several figure-of-eight sutures were needed for hemostasis.  Once the introital body was brought together the perineum was inspected.  The rectus muscle was easily visualized it was reinforced with the figure-of-eight suture of 3-0 Vicryl.  Several deep sutures were placed the perineal body.  The rest of the perineum was repaired with 3-0 Vicryl running down and then subcuticular going up.  Good hemostasis was assured.  Throughout the procedure fundal checks were performed as well.  Bladder was drained at the end of the procedure.  Total     Benita Almanzar MD

## 2023-03-03 NOTE — ANESTHESIA PREPROCEDURE EVALUATION
Anesthesia Pre-Procedure Evaluation    Patient: Alan Hayes   MRN: [id number] : 1995        Procedure : * No procedures listed *          No past medical history on file.   No past surgical history on file.   No Known Allergies   Social History     Tobacco Use     Smoking status: Not on file     Smokeless tobacco: Not on file   Substance Use Topics     Alcohol use: Not on file      Wt Readings from Last 1 Encounters:   23 66.7 kg (147 lb)        Anesthesia Evaluation            ROS/MED HX  ENT/Pulmonary:  - neg pulmonary ROS     Neurologic:  - neg neurologic ROS     Cardiovascular:  - neg cardiovascular ROS     METS/Exercise Tolerance:     Hematologic:  - neg hematologic  ROS     Musculoskeletal:  - neg musculoskeletal ROS     GI/Hepatic:  - neg GI/hepatic ROS     Renal/Genitourinary:  - neg Renal ROS     Endo:  - neg endo ROS     Psychiatric/Substance Use:  - neg psychiatric ROS     Infectious Disease:  - neg infectious disease ROS     Malignancy:  - neg malignancy ROS     Other:      (+) Possibly pregnant, ,         Physical Exam    Airway        Mallampati: II   TM distance: > 3 FB   Neck ROM: full   Mouth opening: > 3 cm    Respiratory Devices and Support         Dental       (+) Completely normal teeth      Cardiovascular          Rhythm and rate: regular and normal     Pulmonary           breath sounds clear to auscultation           OUTSIDE LABS:  CBC:   Lab Results   Component Value Date    WBC 10.4 2023    HGB 14.0 2023    HCT 40.7 2023     2023     BMP: No results found for: NA, POTASSIUM, CHLORIDE, CO2, BUN, CR, GLC  COAGS:   Lab Results   Component Value Date    PTT 26 2023    INR 0.95 2023    FIBR 505 (H) 2023     POC: No results found for: BGM, HCG, HCGS  HEPATIC: No results found for: ALBUMIN, PROTTOTAL, ALT, AST, GGT, ALKPHOS, BILITOTAL, BILIDIRECT, FANG  OTHER: No results found for: PH, LACT, A1C, SELENE, PHOS, MAG, LIPASE, AMYLASE, TSH,  T4, T3, CRP, SED    Anesthesia Plan    ASA Status:  2      Anesthesia Type: Epidural.              Consents    Anesthesia Plan(s) and associated risks, benefits, and realistic alternatives discussed. Questions answered and patient/representative(s) expressed understanding.    - Discussed:     - Discussed with:  Patient         Postoperative Care            Comments:                Carlos Potter MD

## 2023-03-03 NOTE — PLAN OF CARE
Problem: Plan of Care - These are the overarching goals to be used throughout the patient stay.    Goal: Plan of Care Review  Description: The Plan of Care Review/Shift note should be completed every shift.  The Outcome Evaluation is a brief statement about your assessment that the patient is improving, declining, or no change.  This information will be displayed automatically on your shift note.  Outcome: Progressing  Flowsheets (Taken 3/3/2023 6340)  Plan of Care Reviewed With:    patient    spouse     Problem: Postpartum (Vaginal Delivery)  Goal: Successful Maternal Role Transition  Outcome: Progressing     Problem: Postpartum (Vaginal Delivery)  Goal: Hemostasis  Outcome: Progressing     Problem: Postpartum (Vaginal Delivery)  Goal: Absence of Infection Signs and Symptoms  Outcome: Progressing     Problem: Postpartum (Vaginal Delivery)  Goal: Optimal Pain Control and Function  Outcome: Progressing   Bonding well with baby,  Denies change in vision, chest pain, shortness of breath, dizziness. Breastfeeding with assistant, voiding without difficulty, swollen labia, fundus firm at one cm below the umbilicus, midline with light lochia flow, taking PO well, tolerable pain control with Toradol and Tylenol, using ice pack, witch hazel and dermosplast on perineum for comfort. Ambulating independently , tub/sitz bath this shift, VS stable.

## 2023-03-03 NOTE — PROGRESS NOTES
"Vaginal Delivery Postpartum Day 0    Patient Name:  Alan Hayes  :      1995  MRN:      [id number]      Assessment:   complicated by 3rd degree repair.    Plan:  Continue current care. Hemoglobin tomorrow AM is scheduled, will change to earlier if she begins to feel sx of anemia. She is agreeable to blood products if needed.      Subjective:  The patient feels well:  Voiding without difficulty, lochia normal, tolerating normal diet, ambulating without difficulty and passing flatus. She denies headache, vision changes, URQ/epigastric pain, dizziness, lightheadedness, shortness of breath, and tachycardia. She is paler than normal. Voiding independently without complication. Pain is well controlled with current medications.  The patient has no emotional concerns.  The baby is well and being fed by breast.      YOB: 2023   Birth Time: 3:44 AM     Prenatal Complications include:   1) Varicella non-immune  2) Previa--> resolved 2.4cm from os at 34w  3) History of LEEP  4) Bilateral fetal pyelectasis-saw peds urology (UTD A1)    Objective:  /67 (BP Location: Left arm, Patient Position: Semi-Martin's, Cuff Size: Adult Regular)   Pulse 83   Temp 98.2  F (36.8  C)   Resp 18   Ht 1.575 m (5' 2\")   Wt 66.7 kg (147 lb)   LMP 2022   SpO2 100%   Breastfeeding Unknown   BMI 26.89 kg/m    Patient Vitals for the past 24 hrs:   BP Temp Temp src Pulse Resp SpO2   23 0731 -- 98.2  F (36.8  C) -- -- -- --   23 0537 122/67 98.3  F (36.8  C) Oral -- 18 100 %   23 0532 -- -- -- -- -- 100 %   23 -- -- -- -- -- 100 %   23 110/55 -- -- -- -- --   23 -- -- -- -- -- 100 %   23 -- -- -- -- -- 99 %   23 -- -- -- -- -- 97 %   23 -- -- -- -- -- 98 %   23 98/60 -- -- -- -- --   23 -- -- -- -- -- 98 %   23 -- -- -- -- -- 100 %   23 -- -- -- -- -- 91 %   23 -- -- -- -- " -- 99 %   03/03/23 0451 114/70 -- -- -- -- --   03/03/23 0447 -- -- -- -- -- 98 %   03/03/23 0442 -- -- -- -- -- 98 %   03/03/23 0437 -- -- -- -- -- 98 %   03/03/23 0436 115/68 -- -- -- -- --   03/03/23 0432 -- -- -- -- -- 97 %   03/03/23 0427 -- -- -- -- -- 97 %   03/03/23 0421 120/73 98.6  F (37  C) Oral -- -- --   03/03/23 0407 119/71 -- -- -- -- --   03/03/23 0353 116/86 -- -- -- -- --   03/03/23 0312 105/58 -- -- -- -- --   03/03/23 0259 -- 99.2  F (37.3  C) Oral -- 16 --   03/03/23 0200 -- -- -- -- -- 100 %   03/03/23 0159 -- 98.6  F (37  C) Oral -- 16 --   03/03/23 0145 -- -- -- -- -- 100 %   03/03/23 0100 -- -- -- -- -- 93 %   03/03/23 0016 -- 98.5  F (36.9  C) Oral -- 16 --   03/02/23 2307 -- 99.1  F (37.3  C) Oral -- 16 --   03/02/23 2300 95/53 -- -- -- -- 96 %   03/02/23 2241 91/55 -- -- -- -- --   03/02/23 2237 94/51 -- -- -- -- 99 %   03/02/23 2232 96/53 -- -- -- -- 100 %   03/02/23 2227 105/85 -- -- -- -- 100 %   03/02/23 2224 -- 99  F (37.2  C) Oral -- 16 --   03/02/23 2123 104/57 98.8  F (37.1  C) Oral -- 16 --   03/02/23 2122 -- -- -- -- -- 96 %   03/02/23 2117 -- -- -- -- -- 95 %   03/02/23 2116 -- -- -- -- -- (!) 87 %   03/02/23 2115 109/57 -- -- -- -- --   03/02/23 2112 112/62 -- -- -- -- 100 %   03/02/23 2110 119/65 -- -- -- -- --   03/02/23 2109 106/60 -- -- -- -- --   03/02/23 2107 124/67 -- -- -- -- 96 %   03/02/23 2105 116/56 -- -- -- -- 91 %   03/02/23 2102 118/61 -- -- -- -- 96 %   03/02/23 2101 134/63 -- -- -- -- --   03/02/23 2058 118/59 -- -- -- -- --   03/02/23 2057 -- -- -- -- -- 97 %   03/02/23 2056 124/61 -- -- -- -- --   03/02/23 2055 133/63 -- -- -- -- --   03/02/23 2052 -- -- -- -- -- 97 %   03/02/23 2050 -- -- -- -- -- (!) 88 %   03/02/23 2047 -- -- -- -- -- 95 %   03/02/23 1942 -- 98.6  F (37  C) Oral -- 16 --   03/02/23 1941 121/82 -- -- -- -- --   03/02/23 1830 -- 98.3  F (36.8  C) Oral -- -- --   03/02/23 1730 -- 98.1  F (36.7  C) Oral -- -- --   03/02/23 1630 -- 98.2  F  (36.8  C) Oral -- -- --   03/02/23 1530 124/79 97.9  F (36.6  C) Oral 83 16 --   03/02/23 1430 -- 98.4  F (36.9  C) Oral -- -- --   03/02/23 1330 -- 98.2  F (36.8  C) Oral -- -- --   03/02/23 1230 -- 98.4  F (36.9  C) Oral -- -- --   03/02/23 1130 -- 98.4  F (36.9  C) Oral -- -- --   03/02/23 1045 127/84 98.6  F (37  C) Oral 102 16 --   03/02/23 0930 -- 97.9  F (36.6  C) Oral -- -- --       Exam: Patient A&O x 3. No acute distress, breathing unlabored.The amount and color of the lochia is appropriate for the duration of recovery. Uterine fundus is firm at U.     Lab Results   Component Value Date    AS Negative 03/02/2023    HGB 14.0 03/02/2023         There is no immunization history on file for this patient.    Provider:  KIYA Leija CNM    Date:  3/3/2023  Time:  8:24 AM

## 2023-03-03 NOTE — PLAN OF CARE
"  Problem: Labor  Goal: Absence of Infection Signs and Symptoms  Outcome: Progressing   Patient had 3rd degree vaginal laceration, Educated on the s/s of infection and she verbalized understanding.  Problem: Postpartum (Vaginal Delivery)  Goal: Optimal Pain Control and Function  Outcome: Progressing  Patient is resting comfortably in bed, denies pains, using ice pack to her perineum.  Problem: Plan of Care - These are the overarching goals to be used throughout the patient stay.    Goal: Patient-Specific Goal (Individualized)  Description: You can add care plan individualizations to a care plan. Examples of Individualization might be:  \"Parent requests to be called daily at 9am for status\", \"I have a hard time hearing out of my right ear\", or \"Do not touch me to wake me up as it startles me\".  Outcome: Progressing   Patient education on the benefit of breast feeding to infant and mother and she verbalized understanding.      "

## 2023-03-03 NOTE — L&D DELIVERY NOTE
Vaginal Delivery Note    Name: Alan Hayes  : 1995  MRN: [id number]    PRE DELIVERY DIAGNOSIS   27 year old  1 Para 0000 at 39w4d      1) Varicella non-immune  2) Previa--> resolved 2.4cm from os at 34w  3) History of LEEP  4) Bilateral fetal pyelectasis-saw peds urology (UTD A1)    POST DELIVERY DIAGNOSIS  1) 27 year old  @ 39w4d  2) Delivery of a viable infant weighing   via     YOB: 2023     Birth Time: 3:44 AM       Augmentation Yes              Indication: ineffective contraction pattern  Induction No                      Indication: none    Monitors: External and FSE     GBS: Negative    ROM: SROM with AROM of forebag  Fluid Type:Clear and then Meconium    Labor Analgesia/Anesthesia:Epidural    Cord pH obtained: No  Placenta Date/Time: 3/3/2023  3:51 AM   Placenta submitted to Pathology: No    Description of procedure:   27 year old  with PNC w/ MWC and pregnancy complicated by see HPI presented to L&D with spontaneous rupture of membranes and vaginal bleeding that started at 4pm yesterday, soaking multiple pads. She had a low lying placenta this pregnancy that resolved and hx of LEEP. Her hospital course was uncomplicated after bleeding stopped. She was found to be grossly ruptured on spec exam, after 12 hours of minimal cervical change, AROM of forebag was performed with Pitocin augmentation.  Patient progressed to complete with spontaneous rupture of membranes, artificial rupture of membranes and pitocin. She had Cat 2 tracing when she was an ant lip that resolved with pitocin stop, position changes and fluid bolus. Variable decels returned in second stage. Pushed for over 3 hours and was making slow progress and she was getting tired. Discussed recommendation for operative vaginal delivey as fetal station was +3, but bradycardia and variables becoming persistent, back up OB in unit and aware of FHTs.  She had been nauseous through second stage and then started to  vomit causing involuntary pushing, fetal head started to crown and then she delivered baby with forceful emesis.  Shoulder Dystocia No  Operative Vaginal Delivery No  Infant spontaneously delivered over an 3rd degree laceration, location perineal and bilateral sulcus vaginal. See Dr Frost repair note.  It was repaired in the normal fashion using epidural anesthesia and local anesthesia using 3-0 vicryl.  Infant delivered in OLGA position.  Nuchal cord Yes X2   Delivered through    Placenta spontaneously delivered: 3/3/2023  3:51 AM  grossly intact with 3 vessel cord.  Infant:  Live, vigorous infant  was handed to mom.    Delivery was complicated by 3rd degree laceration Interventions included fundal massage and pitocin.    Delivery QBL (mL): 400    Mother and Infant stable.    KIYA Concepcion CNM    3/3/2023 5:01 AM

## 2023-03-03 NOTE — PROGRESS NOTES
RN updated provider on SVE. THEODORE in a delivery [place] and will come to [facility] afterwards. Plan is to let pt rest and labor down. CNM backup is Dr. Almanzar.

## 2023-03-03 NOTE — PROGRESS NOTES
Pt is walking room and breathing through contractions. Per patient request, SVE performed @1350 revealing 5-6/70-80%/-2. Due to inconsistent contraction pattern, pitocin started @1430 and was titrated up to 6. Pitocin was titrated down to 3 per order parameters. Pt described feeling more intense pressure and new onset of nausea. Pt was given 2ml of zofran and describes feeling relief. As pain has increased, the patient is reconsidering her current plan of natural labor and considering desire for epidural. Pt has been laboring in the hydotherapy tub with 1:1  support.     Magaly Arizmendi RN

## 2023-03-04 VITALS
DIASTOLIC BLOOD PRESSURE: 64 MMHG | RESPIRATION RATE: 16 BRPM | OXYGEN SATURATION: 99 % | TEMPERATURE: 98.2 F | HEIGHT: 62 IN | BODY MASS INDEX: 27.05 KG/M2 | HEART RATE: 65 BPM | SYSTOLIC BLOOD PRESSURE: 106 MMHG | WEIGHT: 147 LBS

## 2023-03-04 PROBLEM — Z34.90 TERM PREGNANCY: Status: RESOLVED | Noted: 2023-03-02 | Resolved: 2023-03-04

## 2023-03-04 PROCEDURE — 250N000013 HC RX MED GY IP 250 OP 250 PS 637: Performed by: ADVANCED PRACTICE MIDWIFE

## 2023-03-04 RX ORDER — ACETAMINOPHEN 325 MG/1
650 TABLET ORAL EVERY 4 HOURS PRN
COMMUNITY
Start: 2023-03-04 | End: 2023-03-07

## 2023-03-04 RX ORDER — IBUPROFEN 800 MG/1
800 TABLET, FILM COATED ORAL EVERY 6 HOURS PRN
COMMUNITY
Start: 2023-03-04 | End: 2023-04-10

## 2023-03-04 RX ORDER — DOCUSATE SODIUM 100 MG/1
100 CAPSULE, LIQUID FILLED ORAL 2 TIMES DAILY PRN
COMMUNITY
Start: 2023-03-04 | End: 2023-03-07

## 2023-03-04 RX ADMIN — Medication: at 04:42

## 2023-03-04 RX ADMIN — IBUPROFEN 800 MG: 800 TABLET ORAL at 10:51

## 2023-03-04 RX ADMIN — DOCUSATE SODIUM 100 MG: 100 CAPSULE, LIQUID FILLED ORAL at 07:52

## 2023-03-04 RX ADMIN — IBUPROFEN 800 MG: 800 TABLET ORAL at 03:55

## 2023-03-04 RX ADMIN — ACETAMINOPHEN 650 MG: 325 TABLET ORAL at 07:52

## 2023-03-04 RX ADMIN — ACETAMINOPHEN 650 MG: 325 TABLET ORAL at 01:05

## 2023-03-04 ASSESSMENT — ACTIVITIES OF DAILY LIVING (ADL)
ADLS_ACUITY_SCORE: 18

## 2023-03-04 NOTE — PLAN OF CARE
Problem: Plan of Care - These are the overarching goals to be used throughout the patient stay.    Goal: Plan of Care Review  Description: The Plan of Care Review/Shift note should be completed every shift.  The Outcome Evaluation is a brief statement about your assessment that the patient is improving, declining, or no change.  This information will be displayed automatically on your shift note.  Outcome: Met     Problem: Plan of Care - These are the overarching goals to be used throughout the patient stay.    Goal: Readiness for Transition of Care  Outcome: Met   Discharge instructions provided to patient, patient states understanding of discharge instructions.

## 2023-03-04 NOTE — PROGRESS NOTES
"Vaginal Delivery Postpartum Day 1    Patient Name:  Alan Hayes  :      1995  MRN:      [id number]      Assessment:  Normal postpartum course complicated by 3rd degree laceration at delivery.     Plan:  Continue current care. Desires discharge this afternoon.       Subjective:  The patient feels well:  Voiding without difficulty, lochia normal, tolerating normal diet, ambulating without difficulty and passing flatus. Voiding independently without complication. Pain is well controlled with current medications.  The patient has no emotional concerns.  The baby is well and being fed by breast.      YOB: 2023   Birth Time: 3:44 AM     Prenatal Complications include:   1) Varicella non-immune  2) Previa--> resolved 2.4cm from os at 34w  3) History of LEEP  4) Bilateral fetal pyelectasis-saw peds urology (UTD A1)    Objective:  BP 99/56 (BP Location: Left arm, Patient Position: Sitting)   Pulse 65   Temp 98  F (36.7  C) (Oral)   Resp 18   Ht 1.575 m (5' 2\")   Wt 66.7 kg (147 lb)   LMP 2022   SpO2 97%   Breastfeeding Unknown   BMI 26.89 kg/m    Patient Vitals for the past 24 hrs:   BP Temp Temp src Pulse Resp SpO2   23 0109 99/56 98  F (36.7  C) Oral 65 18 97 %   23 1545 109/64 98.5  F (36.9  C) Oral -- 16 100 %   23 1100 106/61 98.4  F (36.9  C) Oral -- 17 100 %       Exam: Patient A&O x 3. No acute distress, breathing unlabored.The amount and color of the lochia is appropriate for the duration of recovery. Perineum and fundus WNL.     Lab Results   Component Value Date    AS Negative 2023    HGB 11.8 2023         There is no immunization history on file for this patient.    Provider:  KIYA Coyle CNM    Date:  3/4/2023  Time:  7:53 AM      "

## 2023-03-04 NOTE — LACTATION NOTE
Referred to Alan by MD to assess baby's tongue.THis is parents first baby and Alan has a Spectra pump for home use as needed.     It was noted that baby's frenum attached just behind the gumline. When he cried, the tongue did not elevate yet as expected. Parents were shown stretches that can be done on baby to help his body relax.    A brief latch was observed on the R breast in a cross cradle hold. Alan was encouraged to tip her nipple to the roof of baby's mouth for a deeper latch. She mentioned that she feels like he is never satisfied. Swallows were not noticed att his time.    Outpt lactation resources and ECFE were discussed for after discharge.

## 2023-03-04 NOTE — DISCHARGE SUMMARY
OB Discharge Summary      Date:  3/4/2023    Name:  Alan Hayes  :  1995  MRN:  [id number]      Admission Date:  3/2/2023  Delivery Date: 3/3/2023   Gestational Age at Delivery:  39w4d  Discharge Date:  3/4/2023    Principal Diagnosis:    Patient Active Problem List   Diagnosis      (normal spontaneous vaginal delivery)     Third degree laceration of perineum during delivery, postpartum         Conditions complicating Pregnancy:   1) Varicella non-immune  2) Previa--> resolved 2.4cm from os at 34w  3) History of LEEP  4) Bilateral fetal pyelectasis-saw peds urology (UTD A1)    Procedure(s) Performed:   and 3rd degree laceration repair    Indication for :  N/A  Indication for Induction:  N/A     Condition at Discharge:  Stable/well    Discharge Medications:      Review of your medicines      START taking      Dose / Directions   acetaminophen 325 MG tablet  Commonly known as: TYLENOL      Dose: 650 mg  Take 2 tablets (650 mg) by mouth every 4 hours as needed for mild pain  Refills: 0     benzocaine-menthol 20-0.5 % Aero  Commonly known as: DERMOPLAST      Dose: 1 applicator  Apply 1 g topically 4 times daily as needed (perineal pain)  Refills: 0     docusate sodium 100 MG capsule  Commonly known as: COLACE      Dose: 100 mg  Take 1 capsule (100 mg) by mouth 2 times daily as needed for constipation  Refills: 0     ibuprofen 800 MG tablet  Commonly known as: ADVIL/MOTRIN      Dose: 800 mg  Take 1 tablet (800 mg) by mouth every 6 hours as needed for other (cramping)  Refills: 0     witch hazel-glycerin pad  Commonly known as: TUCKS      Apply topically every hour as needed for hemorrhoids or other (episiotomy pain/itching)  Refills: 0           Where to get your medicines      Some of these will need a paper prescription and others can be bought over the counter. Ask your nurse if you have questions.    You don't need a prescription for these medications    acetaminophen 325 MG  tablet    benzocaine-menthol 20-0.5 % Aero    docusate sodium 100 MG capsule    ibuprofen 800 MG tablet    witch hazel-glycerin pad          Discharge Plan:    Follow up with /JOSEMANUELM:  6 weeks postpartum   Patient Instructions:      Physical activity: As tolerated. Nothing in the vagina for 6 weeks.    Diet:  Regular    Medication: As listed above. May alternate ibuprofen and acetaminophen for pain management. Stool softeners, hydration, increased fiber.     Other: Warning signs, when to call discussed.       Physician/CNM: KIYA Coyle CNM    Name:  Alan Hayes  :  1995  MRN:  [id number]

## 2023-03-04 NOTE — PLAN OF CARE
Problem: Postpartum (Vaginal Delivery)  Goal: Successful Maternal Role Transition  Outcome: Progressing  Goal: Hemostasis  Outcome: Progressing  Goal: Absence of Infection Signs and Symptoms  Outcome: Progressing  Goal: Anesthesia/Sedation Recovery  Outcome: Progressing  Goal: Optimal Pain Control and Function  Outcome: Progressing  Goal: Effective Urinary Elimination  Outcome: Progressing    VSS. Denies preeclampsia s/s. C/O uterine and vaginal pain, Ice pack and tylenol given for pain control. Denies nausea. Fundal checks WNL, light lochia no clots reported. Breastfeeding and finger feeding baby colostrum, attentive to feeding cues. Using pump in room. Voiding spontaneously. Up independently in room.

## 2023-03-06 PROBLEM — N87.1 CIN II (CERVICAL INTRAEPITHELIAL NEOPLASIA II): Status: ACTIVE | Noted: 2020-07-28

## 2023-03-06 PROBLEM — R87.810 ASCUS WITH POSITIVE HIGH RISK HPV CERVICAL: Status: ACTIVE | Noted: 2018-11-05

## 2023-03-06 PROBLEM — R87.610 ASCUS WITH POSITIVE HIGH RISK HPV CERVICAL: Status: ACTIVE | Noted: 2018-11-05

## 2023-03-06 RX ORDER — PRENATAL VIT/IRON FUM/FOLIC AC 27MG-0.8MG
TABLET ORAL
COMMUNITY

## 2023-03-06 RX ORDER — ASPIRIN 81 MG/1
TABLET ORAL
COMMUNITY
End: 2023-03-07

## 2023-03-06 RX ORDER — AMOXICILLIN 500 MG/1
CAPSULE ORAL
COMMUNITY
Start: 2023-01-29 | End: 2023-03-07

## 2023-03-07 ENCOUNTER — ALLIED HEALTH/NURSE VISIT (OUTPATIENT)
Dept: MIDWIFE SERVICES | Facility: CLINIC | Age: 28
End: 2023-03-07
Payer: COMMERCIAL

## 2023-03-07 VITALS — SYSTOLIC BLOOD PRESSURE: 110 MMHG | DIASTOLIC BLOOD PRESSURE: 66 MMHG

## 2023-03-07 DIAGNOSIS — O92.13 CRACKED NIPPLE ASSOCIATED WITH LACTATION: Primary | ICD-10-CM

## 2023-03-07 PROCEDURE — 99205 OFFICE O/P NEW HI 60 MIN: CPT | Performed by: ADVANCED PRACTICE MIDWIFE

## 2023-03-07 RX ORDER — MULTIVIT-MIN/IRON/FOLIC ACID/K 18-600-40
CAPSULE ORAL
COMMUNITY

## 2023-03-07 RX ORDER — MUPIROCIN 20 MG/G
OINTMENT TOPICAL
Qty: 15 G | Refills: 0 | Status: SHIPPED | OUTPATIENT
Start: 2023-03-07

## 2023-03-07 ASSESSMENT — EDINBURGH POSTNATAL DEPRESSION SCALE (EPDS)
THINGS HAVE BEEN GETTING ON TOP OF ME: NO, I HAVE BEEN COPING AS WELL AS EVER
I HAVE LOOKED FORWARD WITH ENJOYMENT TO THINGS: AS MUCH AS I EVER DID
I HAVE BEEN ABLE TO LAUGH AND SEE THE FUNNY SIDE OF THINGS: AS MUCH AS I ALWAYS COULD
TOTAL SCORE: 0
I HAVE BEEN SO UNHAPPY THAT I HAVE HAD DIFFICULTY SLEEPING: NOT AT ALL
I HAVE FELT SAD OR MISERABLE: NO, NOT AT ALL
I HAVE FELT SCARED OR PANICKY FOR NO GOOD REASON: NO, NOT AT ALL
I HAVE BLAMED MYSELF UNNECESSARILY WHEN THINGS WENT WRONG: NO, NEVER
I HAVE BEEN SO UNHAPPY THAT I HAVE BEEN CRYING: NO, NEVER
I HAVE BEEN ANXIOUS OR WORRIED FOR NO GOOD REASON: NO, NOT AT ALL
THE THOUGHT OF HARMING MYSELF HAS OCCURRED TO ME: NEVER

## 2023-03-07 NOTE — PROGRESS NOTES
"Assessment:   1.  Four day old infant within normal parameters for weight loss: < 5%   2.  Initially with some shallow latch, improved after first minute of feeding.  Milk transfer adequate to baby's needs during observed feeding in office today  3.  Mother with milk supply becoming established:  Engorged today  4.  Mother with nipple trauma likely r/t tongue tie (now released) and resulting shallow latch    Plan:   1.  Use good positioning for deep latch, with baby held close to body and baby's head/shoulders/hips in good alignment.  The laid-back position is working well for you right now.  2.   Present breast in the \"sandwich\" hold, compressing breast vertically and in line with baby's mouth, for baby to get a larger mouthful of breast and a deeper latch.  If there is pinching or pain, try using a finger to give a little gentle pressure on him chin to help him open more widely and take in more of your breast.  If it is still painful, use a finger to break the suction, remove baby from the breast and try again until there is no pain with nursing.  There is sometimes a little pain when the baby first begins sucking, but after the first few seconds there should be no pain--only a tugging feeling.  3.  Babies latch best to the breast by bringing their chin in first, so point your nipple towards baby's nose, tuck the chin in close, and then wait for his mouth to open.  When his mouth opens, bring his head in deeply.  Baby's chin should be snugged deeply in your breast, their upper cheeks should be touching the breast, and their nose just out of the breast.  4.  To continue to nurse baby on cue, 8-12 times each day.  Feed on one side until baby finishes swallowing.  Once swallowing slows, use breast compression to encourage more swallowing, but once there is no more active swallowing, and baby is either sleeping, coming off the breast, or just \"nibbling,\" it is OK to use a finger to take baby off the breast and move to " "the other breast.  Do the same on the other side.  Offer both breasts at each feeding.  It is more important to watch the baby than the clock!   5. After feeding, apply a thin layer of antibiotic ointment to your nipples, and cover with a square of plastic wrap to help with healing. Do this until your nipples are completely healed, and then you can move back to the Silverettes.  RX for Bactroban transmitted to pharmacy.  6.  If your nipples are so painful that you are not able to nurse comfortably, try a \"nursing vacation.\"  Stop nursing on the most painful breast for 24 hours, and pump that side instead, while continuing to nurse on the \"better\" side.  The next day, do the opposite.  You may then be healed enough to resume nursing on both sides.  7.  Follow up with lactation as needed, and pediatric provider as planned.  EcoloCap can be used for brief questions, but it's important to know that messages are not seen Friday through Sunday. If urgent help is needed, Monday through Friday you can call [phone] and one of our lactation consultants will get the message and respond; if you need a rapid response over a weekend or holiday, it is best to call your on-call maternity or pediatric provider.  Please feel free to schedule a return visit if the concern is more detailed;  telephone visits are also an option if you don't feel you need to be seen in person.      Subjective: Alan is here today because of painful feeding--nipples are cracked and bleeding.  She reports that baby had a tongue tie that pediatric provider was able to clip yesterday, and Alan feels that this has very much improved his latch and swallowing, but she is still having significant pain.  Is using nipple cream, ice and silverettes with minimal effect.  Having some difficulty finding comfortable nursing positions given 3rd degree laceration--using mostly a modified laid-back position.  She is feeling engorgement today.    Alan is vaccinated for " Covid-19.    Hospital Course: Spontaneous labor with bleeding;  Augmented with about 12h Pitocin;  Prolonged 2nd stage but uncomplicated birth.  Postpartum complicated by 3rd degree laceration.  Seen by hospital IBCLC to evaluate for tongue tie--noted anteriorly-placed frenulum and decreased tongue lift.    Mother's Relevant Med/Surg History: noncontributory    Breast Surgery: none    Breastfeeding Goals: continued exclusive breastfeeding    Previous Breastfeeding Experience: first baby    Infant's name: Nicholas  Infant's bday: [birth year]  Gestational age: 39w4d  Infant's birth weight: 7 # 5.8 oz  Discharge weight: 7 # 3.34 oz  Recent weights:  3/6/23:  7 # 2 oz at pediatric visit    Mode of delivery: vaginal  Pediatric Provider: [facility], Dr. Anya Mcmullen. Alan gives her permission for today's note to be forwarded to Dr. Mcmullen.  SHONA signed and filed in Alan's chart as Nciholas has no local active pediatric chart.    Frequency and duration of feedings: every 2-3 hours  Swallows audible per mother: yes  Numbers of feedings in 24 hours: 8-12  Number urines per day: 4-5  Number of stools per day and their color: 4-5, yellow brown    Supplementation: with one bottle during the night, about 30 ml  Pumpin-3 times/day using Haakaa, yielding 1 - 2 oz    Objective/Physical exam:   Mother: Noticed breasts grew larger and areolas darkened during pregnancy and she noticed primary engorgement when her milk came in on day 3 into this morning.  Her nipples are everted, the areola is compressible, the breast is soft and full.     Sore nipples: yes, excoriated bilaterally, left side > right  EPDS: 0    Assessment of infant: 31.35% Weight for age percentile   Age today: 4 days  Today's weight: 7# 2.8 oz  Amount of milk transferred from LEFT side: 0.8 oz  Amount of milk transferred from RIGHT side: 0.4 oz    Baby has full flexion of arms and legs, normal tone, behavior is alert and active, respirations are  normal, skin is normal, hydration is normal, jaw is normal size and alignment, palate is normal, frenulum is normal, baby can lateralize tongue, has adequate tongue lift, and tongue can protrude past bottom gum line. Upper labial frenulum is recently released and is normal.  Noted that baby has somewhat tight jaw and tends to keep head in more flexed position.    Suck exam:  Baby has strong, coordinated suck with good tongue cupping    Baby thrush: none   Jaundice: none     Feeding assessment: Baby can hold suction with tongue while at the breast.     Alignment: The baby was flex relaxed. Baby's head was aligned with its trunk. Baby did face mother. Baby was in sidelying, laid-back and modified laid-back positions today, attaining deepest latch in modified lack-back position.    Areolar Grasp: Baby was able to open mouth widely. Baby's lips were not pursed. Baby's lips did flange outward. Tongue was visible over bottom gum. Baby had complete seal.     Areolar Compression: Baby made rhythmic motion. There were no clicking or smacking sounds. There was no severe nipple discomfort. Left nipples appeared somewhat compressed after feeding;  Right nipple was rounded.  Excoriation present but did not appear exacerbated with this breastfeeding.     Audible swallowing: Baby made quiet sounds of swallowing: There was an increase in frequency after milk ejection reflex. The milk ejection reflex is normal and milk supply is becoming established.     /66 (BP Location: Right arm, Patient Position: Sitting, Cuff Size: Adult Regular)   LMP 2022   Breastfeeding Yes   OB History    Para Term  AB Living   1 1 1 0 0 1   SAB IAB Ectopic Multiple Live Births   0 0 0 0 1      # Outcome Date GA Lbr Uriel/2nd Weight Sex Delivery Anes PTL Lv   1 Term 23 39w4d / 03:54 3.34 kg (7 lb 5.8 oz) M Vag-Spont EPI N HARSH      Name: CHRISTINE TAM-NADIR      Apgar1: 8  Apgar5: 9       Current Outpatient Medications:       benzocaine-menthol (DERMOPLAST) 20-0.5 % AERO, Apply 1 g topically 4 times daily as needed (perineal pain), Disp: , Rfl:      ibuprofen (ADVIL/MOTRIN) 800 MG tablet, Take 1 tablet (800 mg) by mouth every 6 hours as needed for other (cramping), Disp: , Rfl:      mupirocin (BACTROBAN) 2 % external ointment, Apply a thin layer of ointment on your nipples after feeding, then cover with a square of plastic wrap.  You do not need to wash or wipe off before feeding again., Disp: 15 g, Rfl: 0     Prenatal Vit-Fe Fumarate-FA (PRENATAL MULTIVITAMIN W/IRON) 27-0.8 MG tablet, , Disp: , Rfl:      Vitamin D, Cholecalciferol, 25 MCG (1000 UT) TABS, , Disp: , Rfl:      witch hazel-glycerin (TUCKS) pad, Apply topically every hour as needed for hemorrhoids or other (episiotomy pain/itching), Disp: , Rfl:      amoxicillin (AMOXIL) 500 MG capsule, , Disp: , Rfl:   No past medical history on file.  Past Surgical History:   Procedure Laterality Date     LEEP TX, CERVICAL       Family History   Problem Relation Age of Onset     No Known Problems Mother      No Known Problems Father      Other - See Comments Brother         One Kidney     No Known Problems Maternal Grandmother      No Known Problems Maternal Grandfather      No Known Problems Paternal Grandmother      No Known Problems Paternal Grandfather        Time spent:  Chart review/prechartin min prior to day of service  Face-to-face visit:   50 min   Documentation:  23 min  Total time spent on day of service: 73 min  Jinny Kaba, APRN, CNM, IBCLC

## 2023-03-07 NOTE — PATIENT INSTRUCTIONS
"  1.  Use good positioning for deep latch, with baby held close to body and baby's head/shoulders/hips in good alignment.  The laid-back position is working well for you right now.  2.   Present breast in the \"sandwich\" hold, compressing breast vertically and in line with baby's mouth, for baby to get a larger mouthful of breast and a deeper latch.  If there is pinching or pain, try using a finger to give a little gentle pressure on him chin to help him open more widely and take in more of your breast.  If it is still painful, use a finger to break the suction, remove baby from the breast and try again until there is no pain with nursing.  There is sometimes a little pain when the baby first begins sucking, but after the first few seconds there should be no pain--only a tugging feeling.  3.  Babies latch best to the breast by bringing their chin in first, so point your nipple towards baby's nose, tuck the chin in close, and then wait for his mouth to open.  When his mouth opens, bring his head in deeply.  Baby's chin should be snugged deeply in your breast, their upper cheeks should be touching the breast, and their nose just out of the breast.  4.  To continue to nurse baby on cue, 8-12 times each day.  Feed on one side until baby finishes swallowing.  Once swallowing slows, use breast compression to encourage more swallowing, but once there is no more active swallowing, and baby is either sleeping, coming off the breast, or just \"nibbling,\" it is OK to use a finger to take baby off the breast and move to the other breast.  Do the same on the other side.  Offer both breasts at each feeding.  It is more important to watch the baby than the clock!   5. After feeding, apply a thin layer of antibiotic ointment to your nipples, and cover with a square of plastic wrap to help with healing. Do this until your nipples are completely healed, and then you can move back to the Silverettes.  6.  If your nipples are so painful " "that you are not able to nurse comfortably, try a \"nursing vacation.\"  Stop nursing on the most painful breast for 24 hours, and pump that side instead, while continuing to nurse on the \"better\" side.  The next day, do the opposite.  You may then be healed enough to resume nursing on both sides.  7.  Follow up with lactation as needed, and pediatric provider as planned.  Bringrr can be used for brief questions, but it's important to know that messages are not seen Friday through Sunday. If urgent help is needed, Monday through Friday you can call [phone] and one of our lactation consultants will get the message and respond; if you need a rapid response over a weekend or holiday, it is best to call your on-call maternity or pediatric provider.  Please feel free to schedule a return visit if the concern is more detailed;  telephone visits are also an option if you don't feel you need to be seen in person.    ________________    For good videos on the laid-back breastfeeding position:  Www.GameTubebreastfeeding.HardMetrics  Www.Philoptima     __________________    Video of sidelying breastfeeding     _________________    Good breastfeeding resources:    Websites:  www.Everlater  www.Fandium.HardMetrics/blog/  www.llli.org/breastfeeding-info/    Instagram:    [email or web address]  [email or web address]    Books:   The Womanly Art of Breastfeeding by [employer]  Breastfeeding Doesn't Need to Suck, by Tara Ferro      For help with using baby carriers:  https://babywearingtwincities.org/   "

## 2023-04-10 ENCOUNTER — ALLIED HEALTH/NURSE VISIT (OUTPATIENT)
Dept: MIDWIFE SERVICES | Facility: CLINIC | Age: 28
End: 2023-04-10
Payer: COMMERCIAL

## 2023-04-10 VITALS — SYSTOLIC BLOOD PRESSURE: 106 MMHG | DIASTOLIC BLOOD PRESSURE: 62 MMHG

## 2023-04-10 PROCEDURE — 99215 OFFICE O/P EST HI 40 MIN: CPT | Performed by: ADVANCED PRACTICE MIDWIFE

## 2023-07-26 NOTE — PROGRESS NOTES
"Outpatient Lactation Consultation Visit:      Assessment:   Outpatient lactation consultation visit  5w3d old  infant   Normal weight gain  Infant with discomfort during let down, possible upper airway inspiratory stridor during let down   Normal milk transfer  S/p tongue tie, released    Plan:   -Continue to breastfeed on demand, at least 8-12 times a day using techniques demonstrated today during visit. Offer both sides at each feeding. If baby is full after first side, start with the other side at the next feeding. Latch baby deeply, aiming the nipple for the roof of baby's mouth. If baby's lips are rolled inward, flip the top lip out with your finger, and then apply gentle downward pressure to the chin to help the lips flange out like \"fish lips.\" If you have pain that lasts beyond the initial latch-on, always restart. When sucking/swallowing frequency starts to slow down, do breast compressions/massage and tickle baby's feet to keep alert with feeding. A diaper change between sides can be helpful to keep her alert.  -Supplementation plan: No need to supplement. OK to offer a bottle when you need a break from nursing; try and pump whenever you do this.   -Recommended to pump: whenever she receives a bottle. Ok to pump briefly as needed after nursing if you are very uncomfortable from engorgement, but try to minimize how much you do this so that you don't generate a huge oversupply.  -Continue to monitor output, expect at least 6 wet diapers per day.   Reviewed normal infant weight patterns: Since infant has regained birth weight, infant should gain 0.5-1 oz per day. Use the same scale with weight checks.  -Infant requires 3-5 oz of milk per day based on today's weight to be gaining weight.    -Baby will return to see provider next week for 2 month visit.  Encouraged to request reevaluation of infant's breathing during breast-feeding - evaluate for possible upper airway inspiratory " stridor.  -Anticipatory guidance given about (including s/sx) plugged ducts and mastitis. Disc sx relief measures prn.   -Reviewed s/sx of postpartum depression and anxiety. Discussed role and importance of sleep and support in the postpartum period.   -Handout with info on New Parent Connection Group at [facility] and Local [facility] group information given.  -Follow-up with [facility] Outpatient Lactation by phone, MyChart or a return clinic visit as desired.    Patient voices understanding with plan.     40 minutes spent on the date of the encounter doing chart review, review of test results, patient visit and documentation     Rose Marie HUERTAS, THEODORE, IBCLC     Subjective:   Alan is a 26 yo , here with her infant Nicholas, for a lactation consult because: she would like some breastfeeding questions answered. Originally scheduled to be evaluated for thrush due to some sharp shooting pains she was having in her breast between and during feedings intermittently. That pain has lessened. She does not think she had thrush.     She was seen by YAIR Edwards shortly after birth.     She has questions today about: baby being hungrier in evenings, coughing/wheezing during let down, cluster feedings, mouth breathing.     Breastfeeding Goals:   Exclusive Breastfeeding    Breastfeeding Experience:   None.    Infant's name: Nicholas  Infant's bday: [birth year]  Gestational age: 39w4d  Infant's birth weight: 7 # 5.8 oz  Discharge weight: 7 # 3.34 oz  Recent Weight: 9lb 1oz on 3/23/23  Today's Weight:  10lb 11.6oz (with onesie and diaper on) (a gain of 26.6 oz in 18 days)    Birth notes: Spontaneous labor with bleeding;  Augmented with about 12h Pitocin;  Prolonged 2nd stage but uncomplicated birth.  Postpartum complicated by 3rd degree laceration.     Breast changes in pregnancy: yes    EPDS score today: 2, 0 to #10  Number of feedings in 24 hrs: 12  Length of feedings: 30 minutes in duration.   75 % of the time baby takes both  sides.  Audible swallows?: yes    Pain with latch: no     Pumping: once per day, obtaining 3-4 oz   Supplementation: yes, one bottle daily in place of breastfeeding    Number of infant stools: 5-6  Stool color:  yellow, seedy  Number of Infant voids: 8-9    Objective:     Physical Exam:  /62 (BP Location: Right arm, Patient Position: Sitting, Cuff Size: Adult Regular)   LMP 05/30/2022   Breastfeeding Yes     Assessment of Mother:  Right breast evaluated only  Areola: Compressible.    Nipples: Everted and intact. No cracks of abrasions.     Breasts: Full but not engorged, non-tender, no masses, no erythema.       Infant Assessment:   Infant: Normal tone, full flexion of arms and legs, normal respiratory status, skin WDL.   Normal oral mucosa, no white patches. Strong suck. Normal tounge tracking. Frenulum is normal, baby can lateralize tongue, lift tongue adequately, tongue can protrude tongue past bottom gum line. Upper lip not tethered, normal upper lip frenulum, lip flanges well.     Feeding Assessment:  Infant at Breast: Infant was flex-relaxed, lower arm in front of body, and baby slightly rotated to ceiling, alert and active. Head aligned with trunk.  Latch: Infant was able to open mouth wide. Lips were not pursed and were flanged outward. Infant had a complete seal & deep latch. Able to sustain a suck, swallow & breathe pattern though some disruptions in latch during let down. Appeared uncomfortable with some gasping and possible inspiratory stridor noted during let down.   Suck: Infant made active, rhythmic motions. Intermittent sucking noted at times, especially during let down. There was no severe nipple discomfort.   Swallows: Infant made quiet sounds of swallowing: There was an increase in frequency after milk ejection relfex. Patient states she hears swallows during feedings.    After nursing: nipple appears round. Mother reports no pain.     Total Transfer at this visit 1.2oz right breast only.  15:37 16:13

## 2024-03-10 ENCOUNTER — HEALTH MAINTENANCE LETTER (OUTPATIENT)
Age: 29
End: 2024-03-10

## 2025-03-16 ENCOUNTER — HEALTH MAINTENANCE LETTER (OUTPATIENT)
Age: 30
End: 2025-03-16